# Patient Record
Sex: MALE | Race: AMERICAN INDIAN OR ALASKA NATIVE | NOT HISPANIC OR LATINO | URBAN - METROPOLITAN AREA
[De-identification: names, ages, dates, MRNs, and addresses within clinical notes are randomized per-mention and may not be internally consistent; named-entity substitution may affect disease eponyms.]

---
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
## 2018-05-10 ENCOUNTER — OUTPATIENT (OUTPATIENT)
Dept: OUTPATIENT SERVICES | Facility: HOSPITAL | Age: 18
LOS: 1 days | Discharge: HOME | End: 2018-05-10

## 2018-05-10 ENCOUNTER — LABORATORY RESULT (OUTPATIENT)
Age: 18
End: 2018-05-10

## 2018-05-10 ENCOUNTER — APPOINTMENT (OUTPATIENT)
Dept: PEDIATRIC HEMATOLOGY/ONCOLOGY | Facility: CLINIC | Age: 18
End: 2018-05-10

## 2018-05-10 VITALS
SYSTOLIC BLOOD PRESSURE: 130 MMHG | HEART RATE: 74 BPM | BODY MASS INDEX: 21.77 KG/M2 | TEMPERATURE: 98.4 F | WEIGHT: 147 LBS | RESPIRATION RATE: 18 BRPM | HEIGHT: 69 IN | DIASTOLIC BLOOD PRESSURE: 80 MMHG

## 2018-05-14 LAB
HCT VFR BLD CALC: 40.7 %
HGB BLD-MCNC: 14.2 G/DL
MCHC RBC-ENTMCNC: 29.2 PG
MCHC RBC-ENTMCNC: 34.9 G/DL
MCV RBC AUTO: 83.6 FL
PLATELET # BLD AUTO: 221 K/UL
PMV BLD: 9.9 FL
RBC # BLD: 4.87 M/UL
RBC # FLD: 12.3 %
WBC # FLD AUTO: 10.73 K/UL

## 2018-05-25 DIAGNOSIS — C83.71 BURKITT LYMPHOMA, LYMPH NODES OF HEAD, FACE, AND NECK: ICD-10-CM

## 2018-05-25 DIAGNOSIS — Z08 ENCOUNTER FOR FOLLOW-UP EXAMINATION AFTER COMPLETED TREATMENT FOR MALIGNANT NEOPLASM: ICD-10-CM

## 2019-04-11 ENCOUNTER — APPOINTMENT (OUTPATIENT)
Dept: PEDIATRIC HEMATOLOGY/ONCOLOGY | Facility: CLINIC | Age: 19
End: 2019-04-11

## 2019-04-11 ENCOUNTER — OUTPATIENT (OUTPATIENT)
Dept: OUTPATIENT SERVICES | Facility: HOSPITAL | Age: 19
LOS: 1 days | Discharge: HOME | End: 2019-04-11

## 2019-04-11 ENCOUNTER — LABORATORY RESULT (OUTPATIENT)
Age: 19
End: 2019-04-11

## 2019-04-11 VITALS — SYSTOLIC BLOOD PRESSURE: 142 MMHG | TEMPERATURE: 98.2 F | HEART RATE: 68 BPM | DIASTOLIC BLOOD PRESSURE: 65 MMHG

## 2019-04-11 DIAGNOSIS — Z85.79 PERSONAL HISTORY OF OTHER MALIGNANT NEOPLASMS OF LYMPHOID, HEMATOPOIETIC AND RELATED TISSUES: ICD-10-CM

## 2019-04-11 NOTE — CONSULT LETTER
[Dear  ___] : Dear  [unfilled], [Courtesy Letter:] : I had the pleasure of seeing your patient, [unfilled], in my office today. [Please see my note below.] : Please see my note below. 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 [FreeTextEntry3] : Best regards and a Koshere Pesach !\par Cassy\par \par Cassy Abdalla MD\par  Pediatrics\par [facility]\par [facility]\par Tel: [phone]\par [email or web address]\par

## 2019-04-11 NOTE — HISTORY OF PRESENT ILLNESS
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   [de-identified] : Denies any weight changes, night sweats, sinus problems [No Feeding Issues] : no feeding issues at this time

## 2019-05-02 ENCOUNTER — APPOINTMENT (OUTPATIENT)
Dept: PEDIATRIC HEMATOLOGY/ONCOLOGY | Facility: CLINIC | Age: 19
End: 2019-05-02

## 2019-05-06 DIAGNOSIS — Z85.71 PERSONAL HISTORY OF HODGKIN LYMPHOMA: ICD-10-CM

## 2019-05-07 LAB
ALBUMIN SERPL ELPH-MCNC: 5.3 G/DL
ALP BLD-CCNC: 61 U/L
ALT SERPL-CCNC: 18 U/L
ANION GAP SERPL CALC-SCNC: 15 MMOL/L
AST SERPL-CCNC: 26 U/L
BILIRUB SERPL-MCNC: 0.4 MG/DL
BUN SERPL-MCNC: 21 MG/DL
CALCIUM SERPL-MCNC: 9.8 MG/DL
CHLORIDE SERPL-SCNC: 101 MMOL/L
CO2 SERPL-SCNC: 25 MMOL/L
CREAT SERPL-MCNC: 0.9 MG/DL
ERYTHROCYTE [SEDIMENTATION RATE] IN BLOOD BY WESTERGREN METHOD: 4 MM/HR
GLUCOSE SERPL-MCNC: 102 MG/DL
HCT VFR BLD CALC: 45.8 %
HGB BLD-MCNC: 15.6 G/DL
MCHC RBC-ENTMCNC: 29.4 PG
MCHC RBC-ENTMCNC: 34.1 G/DL
MCV RBC AUTO: 86.3 FL
PLATELET # BLD AUTO: 190 K/UL
PMV BLD: 9.9 FL
POTASSIUM SERPL-SCNC: 4.5 MMOL/L
PROT SERPL-MCNC: 7.7 G/DL
RBC # BLD: 5.31 M/UL
RBC # FLD: 12.3 %
SODIUM SERPL-SCNC: 141 MMOL/L
WBC # FLD AUTO: 5.81 K/UL

## 2020-07-16 ENCOUNTER — LABORATORY RESULT (OUTPATIENT)
Age: 20
End: 2020-07-16

## 2020-07-16 ENCOUNTER — OUTPATIENT (OUTPATIENT)
Dept: OUTPATIENT SERVICES | Facility: HOSPITAL | Age: 20
LOS: 1 days | Discharge: HOME | End: 2020-07-16

## 2020-07-16 ENCOUNTER — APPOINTMENT (OUTPATIENT)
Dept: PEDIATRIC HEMATOLOGY/ONCOLOGY | Facility: CLINIC | Age: 20
End: 2020-07-16
Payer: MEDICAID

## 2020-07-16 VITALS
SYSTOLIC BLOOD PRESSURE: 139 MMHG | HEART RATE: 92 BPM | HEIGHT: 67.4 IN | RESPIRATION RATE: 16 BRPM | WEIGHT: 155.1 LBS | BODY MASS INDEX: 24.06 KG/M2 | DIASTOLIC BLOOD PRESSURE: 84 MMHG | TEMPERATURE: 97.3 F

## 2020-07-16 DIAGNOSIS — Z00.00 ENCOUNTER FOR GENERAL ADULT MEDICAL EXAMINATION W/OUT ABNORMAL FINDINGS: ICD-10-CM

## 2020-07-16 LAB
ERYTHROCYTE [SEDIMENTATION RATE] IN BLOOD BY WESTERGREN METHOD: 6 MM/HR
HCT VFR BLD CALC: 45 %
HGB BLD-MCNC: 15 G/DL
MCHC RBC-ENTMCNC: 29.1 PG
MCHC RBC-ENTMCNC: 33.3 G/DL
MCV RBC AUTO: 87.2 FL
PLATELET # BLD AUTO: 214 K/UL
PMV BLD: 9.1 FL
RBC # BLD: 5.16 M/UL
RBC # FLD: 12 %
WBC # FLD AUTO: 5.72 K/UL

## 2020-07-16 PROCEDURE — 99214 OFFICE O/P EST MOD 30 MIN: CPT

## 2020-07-20 NOTE — HISTORY OF PRESENT ILLNESS
[de-identified] : Lucas is doing fine.  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  He denies any weight changes or night sweats.  His vision is stable and no sinus problems.

## 2020-07-20 NOTE — REASON FOR VISIT
[Follow-Up Visit] : a follow-up visit for [FreeTextEntry2] : burkitt lymphoma in remission since 2011

## 2020-07-20 NOTE — CONSULT LETTER
[Dear  ___] : Dear ~YANN, [Please see my note below.] : Please see my note below. [Sincerely,] : Sincerely, [FreeTextEntry2] : Monster Mccoy MD\par [facility]\par [address], [address]\[place] [address]\par \par  [FreeTextEntry3] : Cassy Abdalla MD, MARA\par  Pediatrics\par [facility]\par [facility]\par Tel: [phone]\par [email or web address]\par  [FreeTextEntry1] : Dear Monster:\daniel Thank you so much for you phone calls during COVID . I hope you and your family are doing fine.\daniel Lucas remains in remission since 2011.

## 2020-07-22 DIAGNOSIS — Z85.79 PERSONAL HISTORY OF OTHER MALIGNANT NEOPLASMS OF LYMPHOID, HEMATOPOIETIC AND RELATED TISSUES: ICD-10-CM

## 2021-04-20 ENCOUNTER — TRANSCRIPTION ENCOUNTER (OUTPATIENT)
Age: 21
End: 2021-04-20

## 2021-07-20 ENCOUNTER — APPOINTMENT (OUTPATIENT)
Dept: PEDIATRIC HEMATOLOGY/ONCOLOGY | Facility: CLINIC | Age: 21
End: 2021-07-20

## 2021-07-20 ENCOUNTER — APPOINTMENT (OUTPATIENT)
Dept: PEDIATRIC HEMATOLOGY/ONCOLOGY | Facility: CLINIC | Age: 21
End: 2021-07-20
Payer: MEDICAID

## 2021-07-20 ENCOUNTER — OUTPATIENT (OUTPATIENT)
Dept: OUTPATIENT SERVICES | Facility: HOSPITAL | Age: 21
LOS: 1 days | Discharge: HOME | End: 2021-07-20

## 2021-07-20 ENCOUNTER — LABORATORY RESULT (OUTPATIENT)
Age: 21
End: 2021-07-20

## 2021-07-20 VITALS
HEART RATE: 95 BPM | RESPIRATION RATE: 16 BRPM | HEIGHT: 68.5 IN | DIASTOLIC BLOOD PRESSURE: 80 MMHG | BODY MASS INDEX: 22.63 KG/M2 | TEMPERATURE: 98.5 F | SYSTOLIC BLOOD PRESSURE: 127 MMHG | WEIGHT: 151.02 LBS

## 2021-07-20 PROCEDURE — 99214 OFFICE O/P EST MOD 30 MIN: CPT

## 2021-07-21 NOTE — PHYSICAL EXAM
[Normal] : affect appropriate [Ulcers] : no ulcers [Mucositis] : no mucositis [Thrush] : no thrush [de-identified] : right earlobe palpable nodule < pea-sized, nontender, no erythema

## 2021-07-21 NOTE — HISTORY OF PRESENT ILLNESS
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 Doing well. No nightsweats, no HA, no fevers, no weight loss.\par No recent sinus infections.\par \par Graduated college with a degree in business- plans to go back for a second bachelors degree in [employer] science.\par \par reports he noted a right earlobe palpable nodule noted ~1 week ago. no changes, not painful, no trauma or bug bites to the area.  no erythema.\par \par Not sexually active\par Rare alcohol intake\par no drugs or smoking

## 2021-07-21 NOTE — REASON FOR VISIT
[Follow-Up Visit] : a follow-up visit for [Mother] : mother [Non-Hodgkin's Lymphoma] : Non-Hodgkin's lymphoma

## 2021-07-23 DIAGNOSIS — Z85.79 PERSONAL HISTORY OF OTHER MALIGNANT NEOPLASMS OF LYMPHOID, HEMATOPOIETIC AND RELATED TISSUES: ICD-10-CM

## 2021-07-29 LAB
ALBUMIN SERPL ELPH-MCNC: 5.4 G/DL
ALP BLD-CCNC: 67 U/L
ALT SERPL-CCNC: 14 U/L
ANION GAP SERPL CALC-SCNC: 12 MMOL/L
AST SERPL-CCNC: 22 U/L
BILIRUB SERPL-MCNC: 0.5 MG/DL
BUN SERPL-MCNC: 14 MG/DL
CALCIUM SERPL-MCNC: 9.8 MG/DL
CHLORIDE SERPL-SCNC: 102 MMOL/L
CO2 SERPL-SCNC: 26 MMOL/L
CREAT SERPL-MCNC: 1.7 MG/DL
ERYTHROCYTE [SEDIMENTATION RATE] IN BLOOD BY WESTERGREN METHOD: 6 MM/HR
FSH SERPL-MCNC: 5.9 IU/L
GLUCOSE SERPL-MCNC: 95 MG/DL
HCT VFR BLD CALC: 43.1 %
HGB BLD-MCNC: 14.8 G/DL
LDH SERPL-CCNC: 174 U/L
LH SERPL-ACNC: 3 IU/L
MCHC RBC-ENTMCNC: 30 PG
MCHC RBC-ENTMCNC: 34.3 G/DL
MCV RBC AUTO: 87.2 FL
PLATELET # BLD AUTO: 220 K/UL
PMV BLD: 9.5 FL
POTASSIUM SERPL-SCNC: 4.4 MMOL/L
PROT SERPL-MCNC: 7.9 G/DL
RBC # BLD: 4.94 M/UL
RBC # FLD: 12.7 %
RETICS # AUTO: 1.4 %
RETICS AGGREG/RBC NFR: 67.2 K/UL
SODIUM SERPL-SCNC: 140 MMOL/L
T4 FREE SERPL-MCNC: 1.5 NG/DL
TESTOST BND SERPL-MCNC: 11.6 PG/ML
TESTOSTERONE TOTAL S: 252 NG/DL
TSH SERPL-ACNC: 1.14 UIU/ML
WBC # FLD AUTO: 6.55 K/UL

## 2022-06-21 ENCOUNTER — APPOINTMENT (OUTPATIENT)
Dept: PEDIATRIC HEMATOLOGY/ONCOLOGY | Facility: CLINIC | Age: 22
End: 2022-06-21

## 2022-06-22 ENCOUNTER — OUTPATIENT (OUTPATIENT)
Dept: OUTPATIENT SERVICES | Facility: HOSPITAL | Age: 22
LOS: 1 days | Discharge: HOME | End: 2022-06-22

## 2022-06-22 ENCOUNTER — LABORATORY RESULT (OUTPATIENT)
Age: 22
End: 2022-06-22

## 2022-06-22 ENCOUNTER — APPOINTMENT (OUTPATIENT)
Dept: PEDIATRIC HEMATOLOGY/ONCOLOGY | Facility: CLINIC | Age: 22
End: 2022-06-22
Payer: COMMERCIAL

## 2022-06-22 VITALS
WEIGHT: 156 LBS | RESPIRATION RATE: 18 BRPM | BODY MASS INDEX: 23.37 KG/M2 | HEIGHT: 68.5 IN | SYSTOLIC BLOOD PRESSURE: 136 MMHG | TEMPERATURE: 98.4 F | DIASTOLIC BLOOD PRESSURE: 80 MMHG | HEART RATE: 86 BPM

## 2022-06-22 DIAGNOSIS — Z85.79 PERSONAL HISTORY OF OTHER MALIGNANT NEOPLASMS OF LYMPHOID, HEMATOPOIETIC AND RELATED TISSUES: ICD-10-CM

## 2022-06-22 LAB
ALBUMIN SERPL ELPH-MCNC: 4.8 G/DL
ALP BLD-CCNC: 65 U/L
ALT SERPL-CCNC: 33 U/L
ANION GAP SERPL CALC-SCNC: 13 MMOL/L
AST SERPL-CCNC: 30 U/L
BILIRUB SERPL-MCNC: 0.4 MG/DL
BUN SERPL-MCNC: 11 MG/DL
CALCIUM SERPL-MCNC: 9.4 MG/DL
CHLORIDE SERPL-SCNC: 100 MMOL/L
CO2 SERPL-SCNC: 28 MMOL/L
CREAT SERPL-MCNC: 1 MG/DL
EGFR: 108 ML/MIN/1.73M2
ERYTHROCYTE [SEDIMENTATION RATE] IN BLOOD BY WESTERGREN METHOD: 5 MM/HR
GLUCOSE SERPL-MCNC: 119 MG/DL
HCT VFR BLD CALC: 41.6 %
HGB BLD-MCNC: 14.4 G/DL
LDH SERPL-CCNC: 151 U/L
MCHC RBC-ENTMCNC: 30 PG
MCHC RBC-ENTMCNC: 34.6 G/DL
MCV RBC AUTO: 86.7 FL
PLATELET # BLD AUTO: 201 K/UL
PMV BLD: 9.3 FL
POTASSIUM SERPL-SCNC: 4 MMOL/L
PROT SERPL-MCNC: 7 G/DL
RBC # BLD: 4.8 M/UL
RBC # FLD: 12.2 %
SODIUM SERPL-SCNC: 141 MMOL/L
WBC # FLD AUTO: 6.33 K/UL

## 2022-06-22 PROCEDURE — 99214 OFFICE O/P EST MOD 30 MIN: CPT

## 2022-06-22 NOTE — REASON FOR VISIT
[Follow-Up Visit] : a follow-up visit for [Non-Hodgkin's Lymphoma] : Non-Hodgkin's lymphoma [Mother] : mother

## 2022-06-28 NOTE — CONSULT LETTER
[Dear  ___] : Dear  [unfilled], [Courtesy Letter:] : I had the pleasure of seeing your patient, [unfilled], in my office today. [Please see my note below.] : Please see my note below. [Consult Closing:] : Thank you very much for allowing me to participate in the care of this patient.  If you have any questions, please do not hesitate to contact me. [Sincerely,] : Sincerely, [FreeTextEntry2] : Dr Kline [FreeTextEntry3] : Carol Menezes MD\par Pediatric Hematology/Oncology\par [facility]\par [address] [place] [address]\[place] [address]\par \par

## 2022-06-28 NOTE — HISTORY OF PRESENT ILLNESS
[No Feeding Issues] : no feeding issues at this time [de-identified] : 22 y/o male with h/o sinonasal NHL/Burkitt Lymphoma off treatment ~10.5 years seen in clinic today for scheduled follow up visit.  Lucas states that he has been well.  Denies recent fever or infections.  No reports of headaches.  Denies night sweats/weight loss.  No reports of bruising or bleeding.  Eating well and with good energy level.  No acute concerns

## 2022-06-28 NOTE — END OF VISIT
[FreeTextEntry3] : pt seen and agree with above. 10 years off tx for NHL.  Doing well.  Not recently sexually active, no problems with erection/ejaculation in the past as per patient. no resp distress, no chest pain or palpitations.  Good energy.  No recent fevers or nightsweats.  Transition f/u care from onc to PMD. f/u onc prn

## 2022-06-30 DIAGNOSIS — Z85.79 PERSONAL HISTORY OF OTHER MALIGNANT NEOPLASMS OF LYMPHOID, HEMATOPOIETIC AND RELATED TISSUES: ICD-10-CM

## 2022-12-18 ENCOUNTER — NON-APPOINTMENT (OUTPATIENT)
Age: 22
End: 2022-12-18

## 2022-12-19 ENCOUNTER — EMERGENCY (EMERGENCY)
Facility: HOSPITAL | Age: 22
LOS: 0 days | Discharge: HOME | End: 2022-12-19
Attending: EMERGENCY MEDICINE | Admitting: EMERGENCY MEDICINE

## 2022-12-19 VITALS
DIASTOLIC BLOOD PRESSURE: 85 MMHG | HEART RATE: 85 BPM | HEIGHT: 68 IN | OXYGEN SATURATION: 99 % | WEIGHT: 154.98 LBS | RESPIRATION RATE: 17 BRPM | TEMPERATURE: 97 F | SYSTOLIC BLOOD PRESSURE: 144 MMHG

## 2022-12-19 DIAGNOSIS — Z85.72 PERSONAL HISTORY OF NON-HODGKIN LYMPHOMAS: ICD-10-CM

## 2022-12-19 DIAGNOSIS — Z86.39 PERSONAL HISTORY OF OTHER ENDOCRINE, NUTRITIONAL AND METABOLIC DISEASE: ICD-10-CM

## 2022-12-19 DIAGNOSIS — R10.31 RIGHT LOWER QUADRANT PAIN: ICD-10-CM

## 2022-12-19 DIAGNOSIS — N50.89 OTHER SPECIFIED DISORDERS OF THE MALE GENITAL ORGANS: ICD-10-CM

## 2022-12-19 LAB
APPEARANCE UR: CLEAR — SIGNIFICANT CHANGE UP
BACTERIA # UR AUTO: NEGATIVE — SIGNIFICANT CHANGE UP
BILIRUB UR-MCNC: NEGATIVE — SIGNIFICANT CHANGE UP
COLOR SPEC: YELLOW — SIGNIFICANT CHANGE UP
DIFF PNL FLD: ABNORMAL
EPI CELLS # UR: 0 /HPF — SIGNIFICANT CHANGE UP (ref 0–5)
GLUCOSE UR QL: NEGATIVE — SIGNIFICANT CHANGE UP
HYALINE CASTS # UR AUTO: 2 /LPF — SIGNIFICANT CHANGE UP (ref 0–7)
KETONES UR-MCNC: ABNORMAL
LEUKOCYTE ESTERASE UR-ACNC: NEGATIVE — SIGNIFICANT CHANGE UP
NITRITE UR-MCNC: NEGATIVE — SIGNIFICANT CHANGE UP
PH UR: 7 — SIGNIFICANT CHANGE UP (ref 5–8)
PROT UR-MCNC: SIGNIFICANT CHANGE UP
RBC CASTS # UR COMP ASSIST: 8 /HPF — HIGH (ref 0–4)
SP GR SPEC: 1.02 — SIGNIFICANT CHANGE UP (ref 1.01–1.03)
UROBILINOGEN FLD QL: SIGNIFICANT CHANGE UP
WBC UR QL: 0 /HPF — SIGNIFICANT CHANGE UP (ref 0–5)

## 2022-12-19 PROCEDURE — 76870 US EXAM SCROTUM: CPT | Mod: 26

## 2022-12-19 PROCEDURE — 99284 EMERGENCY DEPT VISIT MOD MDM: CPT

## 2022-12-19 NOTE — ED PROVIDER NOTE - CARE PROVIDER_API CALL
Tristen Vines)  Urology  [address] [place] [address], [address]  [place] [address]  Phone: [phone]  Fax: [phone]  Follow Up Time:

## 2022-12-19 NOTE — ED ADULT NURSE NOTE - NSIMPLEMENTINTERV_GEN_ALL_ED
Implemented All Universal Safety Interventions:  [place] to call system. Call bell, personal items and telephone within reach. Instruct patient to call for assistance. Room bathroom lighting operational. Non-slip footwear when patient is off stretcher. Physically safe environment: no spills, clutter or unnecessary equipment. Stretcher in lowest position, wheels locked, appropriate side rails in place.

## 2022-12-19 NOTE — ED PROVIDER NOTE - NSFOLLOWUPINSTRUCTIONS_ED_ALL_ED_FT
TESTICLE PAIN - AfterCare(R) Instructions(ER/ED)            Testicle Pain    WHAT YOU NEED TO KNOW:    Testicle pain may start in your scrotum and spread to your abdomen. You may have sharp, sudden pain or dull pain that happens over time. Your testicle pain may come and go, or it may last for a long time. The cause of your pain may be unknown. Testicle pain can be caused by infection, trauma, hernia, kidney stones, or sexually transmitted infections (STIs). You may have a painful lump in your scrotum. The lump may be caused by an enlarged vein or fluid that collects around one of your testicles. This lump also may be caused by a more serious medical condition. Part of your testicle may twist. This is a serious condition that needs treatment as soon as possible.    DISCHARGE INSTRUCTIONS:    Medicines:   •Antibiotics: This medicine helps fight or prevent infection. Take your antibiotics until they are gone, even if you feel better.      •Pain medicine: You may be given a prescription medicine to decrease pain. Do not wait until the pain is severe before you take this medicine.      •NSAIDs: These medicines decrease swelling, pain, and fever. NSAIDs are available without a doctor's order. Ask your healthcare provider which medicine is right for you. Ask how much to take and when to take it. Take as directed. NSAIDs can cause stomach bleeding and kidney problems if not taken correctly.      •Take your medicine as directed. Contact your healthcare provider if you think your medicine is not helping or if you have side effects. Tell your provider if you are allergic to any medicine. Keep a list of the medicines, vitamins, and herbs you take. Include the amounts, and when and why you take them. Bring the list or the pill bottles to follow-up visits. Carry your medicine list with you in case of an emergency.      Decrease discomfort: With treatment, your pain may improve within 1 to 3 days. Depending on the cause of your testicle pain, your condition may take up to 4 weeks to heal.   •Rest: Limit your activity until your pain decreases. Get more rest while you heal. Do not sit for long periods of time.       •Cold packs: Place cold packs on your testicles to help ease your pain. Use cold packs as directed.      •Elevation: Gently tuck a folded towel under your testicles to lift them as you sit in a chair or lie in bed. This will help ease your pain and decrease swelling.      Follow up with your healthcare provider or urologist in 3 to 7 days: Write down your questions so you remember to ask them during your visits.    Sexual activity: Avoid sexual activity until you have finished your antibiotics or until your healthcare provider tells you it is safe to have sex. Use condoms to lower your risk of STIs.    Contact your healthcare provider or urologist if:   •You feel that your medicine or treatment is not working.      •You feel more pain, tenderness, or swelling than before.      •You have nausea or a low fever.      •You have questions or concerns about your condition or care.      Return to the emergency department if:   •You have sudden or severe pain in your testicles or abdomen.      •You have pain in both testicles.      •You are vomiting.      •You have a high fever.      •Your pain increases when you elevate your testicles.      •Your scrotum turns blue. This could mean your testicle is not getting the blood flow it needs.

## 2022-12-19 NOTE — ED PROVIDER NOTE - NS ED ROS FT
CONST: No fever, chills or bodyaches  CARD: No chest pain, palpitations  RESP: No SOB, cough  GI: No abdominal pain, N/V/D  : see HPI  SKIN: No rashes

## 2022-12-19 NOTE — ED PROVIDER NOTE - PHYSICAL EXAMINATION
CONST: Well appearing in NAD  EYES: PERRL, EOMI, Sclera and conjunctiva clear.  CARD: Normal S1 S2; Normal rate and rhythm  RESP: Equal BS B/L, No wheezes, rhonchi or rales. No distress  GI: Soft, non-tender, non-distended.  : Chaperone Dr. Contreras, exam PA Igneri circumcised male, no external lesions, no appreciable hernias, small subcentimeter mobile mass, possible varicocele. No erythema. No tenderness.  SKIN: Warm, dry, no acute rashes. Good turgor

## 2022-12-19 NOTE — ED PROVIDER NOTE - PATIENT PORTAL LINK FT
You can access the FollowMyHealth Patient Portal offered by [facility] by registering at the following website: [email or web address]. By joining [employer]’s FollowMyHealth portal, you will also be able to view your health information using other applications (apps) compatible with our system.

## 2022-12-19 NOTE — ED ADULT NURSE NOTE - OBJECTIVE STATEMENT
22 year old male complaining of right groin pain x2 weeks which has radiated to right testicle. Pt states he was sent for urgent care for US 22 year old male complaining of right groin pain x2 weeks which has radiated to right testicle. Pt states he was sent from urgent care for US

## 2022-12-19 NOTE — ED PROVIDER NOTE - ATTENDING APP SHARED VISIT CONTRIBUTION OF CARE
21 yo m with pmh of burkits lymphoma presents to ED for r groin pain that started 2 weeks ago which has now radiated to his R testicle. Pt went to  and they did a UA which was negative and told him to come to the ED for an US. Pt has a history of chlamydia. No dysuria, hematuria, or penile discharge.     Const: NAD  Eyes: PERRL, no conjunctival injection  HENT:  Neck supple without meningismus   CV: RRR, Warm, well-perfused extremities  RESP: CTA B/L, no tachypnea   GI: soft, non-tender, non-distended  : (exam done with JED Ovalle), small mobile mass on R testicle vs varicocele. no hernias. testicles are non-tender without swelling   MSK: No gross deformities appreciated  Skin: Warm, dry. No rashes  Neuro: Alert, CNs II-XII grossly intact. Sensation and motor function of extremities grossly intact.  Psych: Appropriate mood and affect.    UA, US and gc/chlamydia

## 2022-12-19 NOTE — ED PROVIDER NOTE - OBJECTIVE STATEMENT
23yo male with PMHx Burkitt's lymphoma, treated chlamydia presents c/o R groin pain x 2 weeks that has since migrated down to R testicle over the last few days and feeling a movable, non-tender "lump" in R testicle. No aggravating or relieving factors. Pt reports was seen PTA at an urgent care and was directed to come to ED for US. Pt denies dysuria, hematuria, bulge to groin, flank or abdominal pain.

## 2022-12-19 NOTE — ED ADULT TRIAGE NOTE - BP NONINVASIVE DIASTOLIC (MM HG)
Patient said that is doing a bit better  Was able to sleep a little last night with the pain medication  Aware to call us if anything changes over the weekend, or severe pain to go to the ER  85

## 2022-12-20 LAB
C TRACH RRNA SPEC QL NAA+PROBE: SIGNIFICANT CHANGE UP
CULTURE RESULTS: SIGNIFICANT CHANGE UP
N GONORRHOEA RRNA SPEC QL NAA+PROBE: SIGNIFICANT CHANGE UP
SPECIMEN SOURCE: SIGNIFICANT CHANGE UP
SPECIMEN SOURCE: SIGNIFICANT CHANGE UP

## 2023-08-30 LAB
APPEARANCE: CLEAR
BILIRUBIN URINE: NEGATIVE
BLOOD URINE: NEGATIVE
COLOR: NORMAL
CREAT SPEC-SCNC: 28 MG/DL
FSH SERPL-MCNC: 4.8 IU/L
GLUCOSE QUALITATIVE U: NEGATIVE
KETONES URINE: NEGATIVE
LEUKOCYTE ESTERASE URINE: NEGATIVE
LH SERPL-ACNC: 4.6 IU/L
NITRITE URINE: NEGATIVE
PH URINE: 7
PROT UR-MCNC: <5 MG/DLG/24H
PROTEIN URINE: NEGATIVE
SPECIFIC GRAVITY URINE: 1
TESTOST FREE SERPL-MCNC: 12.1 PG/ML
TESTOST SERPL-MCNC: 353 NG/DL
UROBILINOGEN URINE: NORMAL

## 2023-12-13 ENCOUNTER — NON-APPOINTMENT (OUTPATIENT)
Age: 23
End: 2023-12-13

## 2024-03-05 ENCOUNTER — NON-APPOINTMENT (OUTPATIENT)
Age: 24
End: 2024-03-05

## 2025-04-09 ENCOUNTER — NON-APPOINTMENT (OUTPATIENT)
Age: 25
End: 2025-04-09